# Patient Record
Sex: FEMALE | Race: WHITE | Employment: FULL TIME | URBAN - NONMETROPOLITAN AREA
[De-identification: names, ages, dates, MRNs, and addresses within clinical notes are randomized per-mention and may not be internally consistent; named-entity substitution may affect disease eponyms.]

---

## 2020-02-24 ENCOUNTER — PROCEDURE VISIT (OUTPATIENT)
Dept: OTOLARYNGOLOGY | Age: 55
End: 2020-02-24
Payer: COMMERCIAL

## 2020-02-24 PROCEDURE — 92553 AUDIOMETRY AIR & BONE: CPT | Performed by: AUDIOLOGIST

## 2020-02-24 PROCEDURE — 92567 TYMPANOMETRY: CPT | Performed by: AUDIOLOGIST

## 2020-03-10 ENCOUNTER — OFFICE VISIT (OUTPATIENT)
Dept: OTOLARYNGOLOGY | Age: 55
End: 2020-03-10
Payer: COMMERCIAL

## 2020-03-10 VITALS — WEIGHT: 293 LBS | HEIGHT: 71 IN | BODY MASS INDEX: 41.02 KG/M2

## 2020-03-10 PROBLEM — H65.492 CHRONIC MEE (MIDDLE EAR EFFUSION), LEFT: Status: ACTIVE | Noted: 2020-03-10

## 2020-03-10 PROCEDURE — 99242 OFF/OP CONSLTJ NEW/EST SF 20: CPT | Performed by: OTOLARYNGOLOGY

## 2020-03-10 RX ORDER — ARMODAFINIL 250 MG/1
TABLET ORAL
COMMUNITY
Start: 2019-12-31

## 2020-03-10 RX ORDER — HYDROCHLOROTHIAZIDE 12.5 MG/1
CAPSULE, GELATIN COATED ORAL
COMMUNITY
Start: 2020-02-15

## 2020-03-10 NOTE — PROGRESS NOTES
62 y.o.  female presents today with left ear fullness and hearing loss. Back in early January she awoke with fullness in her ear which has not resolved. She did not have any prior upper respiratory symptoms. She has not had trouble with her ears recently. She denies any ringing or associated dizziness. She has been using antibiotics and nasal steroids with no relief. Recently she was seen and evaluated by our audiologist and found to have a significant conductive hearing loss in the left ear with likely middle ear fluid. She is referred to ENT for further evaluation    History reviewed. No pertinent family history.   Social History     Socioeconomic History    Marital status: Unknown     Spouse name: None    Number of children: None    Years of education: None    Highest education level: None   Occupational History    None   Social Needs    Financial resource strain: None    Food insecurity     Worry: None     Inability: None    Transportation needs     Medical: None     Non-medical: None   Tobacco Use    Smoking status: Never Smoker    Smokeless tobacco: Never Used   Substance and Sexual Activity    Alcohol use: Not Currently    Drug use: Never    Sexual activity: None   Lifestyle    Physical activity     Days per week: None     Minutes per session: None    Stress: None   Relationships    Social connections     Talks on phone: None     Gets together: None     Attends Mandaeism service: None     Active member of club or organization: None     Attends meetings of clubs or organizations: None     Relationship status: None    Intimate partner violence     Fear of current or ex partner: None     Emotionally abused: None     Physically abused: None     Forced sexual activity: None   Other Topics Concern    None   Social History Narrative    None     Past Medical History:   Diagnosis Date    Conductive hearing loss of right ear with unrestricted hearing of left ear     Dysfunction of Eustachian tube, left      History reviewed. No pertinent surgical history. REVIEW OF SYSTEMS:  all other systems reviewed and are negative  General Health: no change in health status since last visit  Vestibular: denies related complaints  Ears: ear pain No Left recent infection No  Left recent drainage No  Left ear popping/ fullness Yes  Left constant  Hearing: hearing loss: Left suddenly  Tinnitus: No       Comments:     PHYSICAL EXAM:    Ht 5' 11\" (1.803 m)   Wt (!) 301 lb (136.5 kg)   BMI 41.98 kg/m²   Body mass index is 41.98 kg/m². General Appearance: well developed , well nourished and no distress  Head/ Face: normocephalic and atraumatic  Ears: Right Ear: External: external ears normal Otoscopy Ear Canal: canal clear Otoscopy TM: TM's mobile and tympanosclerosis: moderate Left Ear: External: external ears normal Otoscopy Ear Canal: canal clear Otoscopy TM: air/fluid level and tympanosclerosis: moderate  Hearing: Rinne A>B: Left, Rinne A<B: Left, [place] L, Air R>L and see audiogram  Oral:lips: normal Oropharynx:normal tongue: normal   Tonsils: s/p tonsillectomy  Neck: negative and supple  Thyroid: Slight fullness to palpation  Neuro: alert and oriented x3 and cranial nerves II- XII grossly intact  Psych/ Mood: cooperative and no depression, anxiety or agitation    Assessment & Plan:    Problem List Items Addressed This Visit        ENT Problems    Chronic SUMMER (middle ear effusion), left     Persistent left middle ear fluid by history. Likely present for at least 2 months. At this point unlikely to resolve spontaneously. Discussed treatment options including a round of steroids as opposed to myringotomy. Patient opted for the latter so we will set her up to return for myringotomy and likely tube placement            Other    Conductive hearing loss of right ear with unrestricted hearing of left ear     Measurable conductive loss in left ear. Tuning fork reverses in left ear.   Likely secondary to middle ear

## 2020-03-10 NOTE — ASSESSMENT & PLAN NOTE
Persistent left middle ear fluid by history. Likely present for at least 2 months. At this point unlikely to resolve spontaneously. Discussed treatment options including a round of steroids as opposed to myringotomy.   Patient opted for the latter so we will set her up to return for myringotomy and likely tube placement

## 2020-03-16 ENCOUNTER — PROCEDURE VISIT (OUTPATIENT)
Dept: OTOLARYNGOLOGY | Age: 55
End: 2020-03-16
Payer: COMMERCIAL

## 2020-03-16 VITALS
BODY MASS INDEX: 41.02 KG/M2 | WEIGHT: 293 LBS | DIASTOLIC BLOOD PRESSURE: 76 MMHG | HEIGHT: 71 IN | SYSTOLIC BLOOD PRESSURE: 124 MMHG

## 2020-03-16 PROCEDURE — 69433 CREATE EARDRUM OPENING: CPT | Performed by: OTOLARYNGOLOGY

## 2020-03-20 ENCOUNTER — TELEPHONE (OUTPATIENT)
Dept: OTOLARYNGOLOGY | Age: 55
End: 2020-03-20

## 2020-03-20 NOTE — TELEPHONE ENCOUNTER
[facility] was no answer so I left a VM for her to call back if she would like to sign up.

## 2020-03-30 ENCOUNTER — PROCEDURE VISIT (OUTPATIENT)
Dept: OTOLARYNGOLOGY | Age: 55
End: 2020-03-30
Payer: COMMERCIAL

## 2020-03-30 ENCOUNTER — OFFICE VISIT (OUTPATIENT)
Dept: OTOLARYNGOLOGY | Age: 55
End: 2020-03-30
Payer: COMMERCIAL

## 2020-03-30 VITALS
WEIGHT: 293 LBS | HEIGHT: 71 IN | TEMPERATURE: 98.2 F | SYSTOLIC BLOOD PRESSURE: 118 MMHG | BODY MASS INDEX: 41.02 KG/M2 | DIASTOLIC BLOOD PRESSURE: 78 MMHG

## 2020-03-30 PROBLEM — H65.492 CHRONIC MEE (MIDDLE EAR EFFUSION), LEFT: Status: RESOLVED | Noted: 2020-03-10 | Resolved: 2020-03-30

## 2020-03-30 PROBLEM — Z96.22 S/P MYRINGOTOMY WITH INSERTION OF TUBE: Status: ACTIVE | Noted: 2020-03-30

## 2020-03-30 PROCEDURE — 99212 OFFICE O/P EST SF 10 MIN: CPT | Performed by: OTOLARYNGOLOGY

## 2020-03-30 PROCEDURE — 92552 PURE TONE AUDIOMETRY AIR: CPT | Performed by: AUDIOLOGIST

## 2020-03-30 PROCEDURE — 92567 TYMPANOMETRY: CPT | Performed by: AUDIOLOGIST

## 2020-03-30 RX ORDER — CIPROFLOXACIN HYDROCHLORIDE 3.5 MG/ML
SOLUTION/ DROPS TOPICAL
Qty: 1 BOTTLE | Refills: 1 | Status: SHIPPED | OUTPATIENT
Start: 2020-03-30 | End: 2020-09-17

## 2020-03-30 NOTE — PROGRESS NOTES
62 y.o.  female presents today for follow-up. On March 16 she had a left ear tube placed here in the clinic. Since then she has observed a definite improvement in her hearing. She still occasionally has some sense of fullness in the left ear and feels there may be some discharge. History reviewed. No pertinent family history. Social History     Socioeconomic History    Marital status: Unknown     Spouse name: None    Number of children: None    Years of education: None    Highest education level: None   Occupational History    None   Social Needs    Financial resource strain: None    Food insecurity     Worry: None     Inability: None    Transportation needs     Medical: None     Non-medical: None   Tobacco Use    Smoking status: Never Smoker    Smokeless tobacco: Never Used   Substance and Sexual Activity    Alcohol use: Not Currently    Drug use: Never    Sexual activity: None   Lifestyle    Physical activity     Days per week: None     Minutes per session: None    Stress: None   Relationships    Social connections     Talks on phone: None     Gets together: None     Attends Episcopal service: None     Active member of club or organization: None     Attends meetings of clubs or organizations: None     Relationship status: None    Intimate partner violence     Fear of current or ex partner: None     Emotionally abused: None     Physically abused: None     Forced sexual activity: None   Other Topics Concern    None   Social History Narrative    None     Past Medical History:   Diagnosis Date    Conductive hearing loss of right ear with unrestricted hearing of left ear     Dysfunction of Eustachian tube, left      History reviewed. No pertinent surgical history.       REVIEW OF SYSTEMS:  all other systems reviewed and are negative  General Health: see HPI / problem list  Ears: ear popping/ fullness Yes  Left episodic  Hearing: improved: Yes and  Left       Comments:     PHYSICAL

## 2020-08-03 ENCOUNTER — TELEPHONE (OUTPATIENT)
Dept: OTOLARYNGOLOGY | Age: 55
End: 2020-08-03

## 2020-08-03 NOTE — TELEPHONE ENCOUNTER
Pt called states she was covid tested and was negative. Now she has clear drainage from nasal drainage side of ear with tube, her head hurts with vomiting ,aches and chills. Pt is still running a 102 fever since last week. Advised to go to PCP/urgent care office for eval. Pt v/u.

## 2020-08-18 ENCOUNTER — OFFICE VISIT (OUTPATIENT)
Dept: OTOLARYNGOLOGY | Age: 55
End: 2020-08-18
Payer: COMMERCIAL

## 2020-08-18 VITALS
SYSTOLIC BLOOD PRESSURE: 132 MMHG | HEIGHT: 71 IN | WEIGHT: 293 LBS | DIASTOLIC BLOOD PRESSURE: 88 MMHG | BODY MASS INDEX: 41.02 KG/M2

## 2020-08-18 PROBLEM — J31.0 CHRONIC RHINITIS: Status: ACTIVE | Noted: 2020-08-18

## 2020-08-18 PROCEDURE — 99213 OFFICE O/P EST LOW 20 MIN: CPT | Performed by: OTOLARYNGOLOGY

## 2020-08-18 RX ORDER — IPRATROPIUM BROMIDE 42 UG/1
2 SPRAY, METERED NASAL 3 TIMES DAILY
Qty: 1 BOTTLE | Refills: 1 | Status: SHIPPED | OUTPATIENT
Start: 2020-08-18 | End: 2020-09-17

## 2020-08-18 NOTE — PROGRESS NOTES
60 y.o.  female presents today with clear nasal drainage. In late July she began experiencing fever and upper respiratory symptoms of congestion and nasal drainage. She had a nasopharyngeal swab for COVID testing. It was after this that she began noticing the clear nasal drainage. She has since been treated with Augmentin and a few days of oral prednisone. Her fever has subsided but she continues to have clear nasal drainage especially when tilting forward. She has had sinus surgery in the past and was concerned this may have made her susceptible to the nasal drainage after the COVID testing. She was actually tested on 2 separate occasions both of which came back negative. She did feel that the prednisone helped slightly with the drainage but since going off the medication it has since recurred. She denies any aural fullness. History reviewed. No pertinent family history.   Social History     Socioeconomic History    Marital status: Unknown     Spouse name: None    Number of children: None    Years of education: None    Highest education level: None   Occupational History    None   Social Needs    Financial resource strain: None    Food insecurity     Worry: None     Inability: None    Transportation needs     Medical: None     Non-medical: None   Tobacco Use    Smoking status: Never Smoker    Smokeless tobacco: Never Used   Substance and Sexual Activity    Alcohol use: Not Currently    Drug use: Never    Sexual activity: None   Lifestyle    Physical activity     Days per week: None     Minutes per session: None    Stress: None   Relationships    Social connections     Talks on phone: None     Gets together: None     Attends Scientologist service: None     Active member of club or organization: None     Attends meetings of clubs or organizations: None     Relationship status: None    Intimate partner violence     Fear of current or ex partner: None     Emotionally abused: None     Physically abused: None     Forced sexual activity: None   Other Topics Concern    None   Social History Narrative    None     Past Medical History:   Diagnosis Date    Conductive hearing loss of right ear with unrestricted hearing of left ear     Dysfunction of Eustachian tube, left      History reviewed. No pertinent surgical history. REVIEW OF SYSTEMS:  all other systems reviewed and are negative  General Health: see HPI / problem list  Nose: post nasal drip: Yes and congestion: Yes and Essentially resolved  Throat: pain: No       Comments:     PHYSICAL EXAM:    /88   Ht 5' 11\" (1.803 m)   Wt 296 lb (134.3 kg)   BMI 41.28 kg/m²   Body mass index is 41.28 kg/m². General Appearance: well developed , well nourished and no distress  Head/ Face: normocephalic and atraumatic  Vocal Quality: good/ normal  Ears: Right Ear: External: external ears normal Otoscopy Ear Canal: canal clear Otoscopy TM: TM's normal Left Ear: External: external ears normal Otoscopy Ear Canal: extruded tube in canal Otoscopy TM: TM's dull  Hearing: Rinne A>B: Left and Rinne A=B: Left  Nose: mucosa congested and turbinates: hypertrophic  Psych/ Mood: cooperative and no depression, anxiety or agitation    Assessment & Plan:    Problem List Items Addressed This Visit        ENT Problems    Chronic rhinitis     History of prior sinus surgery. Currently not on any maintenance medications. Concerned that COVID testing in the face of her prior sinus surgery may l have caused to the clear nasal drainage. She feels that the drainage has a salty taste like tears. This would imply a possible CSF leak which I feel is unlikely as this whoever administered the test would have to be vastly inexperienced and I would expect her to be most uncomfortable had the swab entered the sinus cavities. She states the test when performed was not all that uncomfortable  This is something certainly that must be kept in mind.   Right now we will treat her with nasal irrigations (dexamethasone/clindamycin) and Atrovent. Will consider CTs testing if drainage persists               No orders of the defined types were placed in this encounter. Orders Placed This Encounter   Medications    ipratropium (ATROVENT) 0.06 % nasal spray     Si sprays by Each Nostril route 3 times daily     Dispense:  1 Bottle     Refill:  1             Please note that this chart was generated using dragon dictation software. Although every effort was made to ensure the accuracy of this automated transcription, some errors in transcription may have occurred.

## 2020-08-18 NOTE — ASSESSMENT & PLAN NOTE
History of prior sinus surgery. Currently not on any maintenance medications. Concerned that COVID testing in the face of her prior sinus surgery may l have caused to the clear nasal drainage. She feels that the drainage has a salty taste like tears. This would imply a possible CSF leak which I feel is unlikely as this whoever administered the test would have to be vastly inexperienced and I would expect her to be most uncomfortable had the swab entered the sinus cavities. She states the test when performed was not all that uncomfortable  This is something certainly that must be kept in mind. Right now we will treat her with nasal irrigations (dexamethasone/clindamycin) and Atrovent.   Will consider CTs testing if drainage persists

## 2020-09-01 ENCOUNTER — OFFICE VISIT (OUTPATIENT)
Dept: OTOLARYNGOLOGY | Age: 55
End: 2020-09-01
Payer: COMMERCIAL

## 2020-09-01 VITALS
DIASTOLIC BLOOD PRESSURE: 82 MMHG | SYSTOLIC BLOOD PRESSURE: 130 MMHG | HEIGHT: 71 IN | BODY MASS INDEX: 41.02 KG/M2 | WEIGHT: 293 LBS

## 2020-09-01 PROBLEM — G44.89 OTHER HEADACHE SYNDROME: Status: ACTIVE | Noted: 2020-09-01

## 2020-09-01 PROCEDURE — 99212 OFFICE O/P EST SF 10 MIN: CPT | Performed by: OTOLARYNGOLOGY

## 2020-09-01 NOTE — ASSESSMENT & PLAN NOTE
Using nasal irrigations and medication as directed. Patient states that drainage resolved late last week.   So as she is no longer concerned about the CSF leak she still has concerns about her headache symptoms

## 2020-09-01 NOTE — PROGRESS NOTES
60 y.o.  female presents today with a history of chronic nasal drainage. She was concerned about the possibility of a CSF leak possibly related to her traumatic COVID nasopharyngeal swab. She also remembers a blow to the head and not too long ago. She has been using the nasal irrigations as directed and late last week observed that the nasal drainage has ceased. She still however has headache symptoms and remains concerned that she may have a more serious problem and wishes to see a neurologist    History reviewed. No pertinent family history. Social History     Socioeconomic History    Marital status: Unknown     Spouse name: None    Number of children: None    Years of education: None    Highest education level: None   Occupational History    None   Social Needs    Financial resource strain: None    Food insecurity     Worry: None     Inability: None    Transportation needs     Medical: None     Non-medical: None   Tobacco Use    Smoking status: Never Smoker    Smokeless tobacco: Never Used   Substance and Sexual Activity    Alcohol use: Not Currently    Drug use: Never    Sexual activity: None   Lifestyle    Physical activity     Days per week: None     Minutes per session: None    Stress: None   Relationships    Social connections     Talks on phone: None     Gets together: None     Attends Buddhism service: None     Active member of club or organization: None     Attends meetings of clubs or organizations: None     Relationship status: None    Intimate partner violence     Fear of current or ex partner: None     Emotionally abused: None     Physically abused: None     Forced sexual activity: None   Other Topics Concern    None   Social History Narrative    None     Past Medical History:   Diagnosis Date    Conductive hearing loss of right ear with unrestricted hearing of left ear     Dysfunction of Eustachian tube, left      History reviewed.  No pertinent surgical history. REVIEW OF SYSTEMS:  all other systems reviewed and are negative  General Health: no change in health status since last visit  Neurologic: headache: Yes  Nose: runny nose: Resolved and post nasal drip: Resolved       Comments:     PHYSICAL EXAM:    /82   Ht 5' 11\" (1.803 m)   Wt 298 lb (135.2 kg)   BMI 41.56 kg/m²   Body mass index is 41.56 kg/m². General Appearance: well developed , well nourished and no distress  Head/ Face: normocephalic and atraumatic  Vocal Quality: good/ normal  Oral:lips: normal Oropharynx:normal tongue: normal   Neuro: alert and oriented x3 and cranial nerves II- XII grossly intact  Psych/ Mood: cooperative and no depression, anxiety or agitation    Assessment & Plan:    Problem List Items Addressed This Visit        ENT Problems    Chronic rhinitis     Using nasal irrigations and medication as directed. Patient states that drainage resolved late last week. So as she is no longer concerned about the CSF leak she still has concerns about her headache symptoms            Other    Other headache syndrome     Patient complaining of headache. Wishes to be referred to neurologist.  Will make appropriate referral         Relevant Don Honeycutt MD, Neurology, [place]          Orders Placed This Encounter   Procedures   Carmela White MD, Neurology, [place]     Referral Priority:   Routine     Referral Type:   Eval and Treat     Referral Reason:   Specialty Services Required     Referred to Provider:   Romaine Dietz MD     Requested Specialty:   Neurology     Number of Visits Requested:   1       No orders of the defined types were placed in this encounter. Please note that this chart was generated using dragon dictation software. Although every effort was made to ensure the accuracy of this automated transcription, some errors in transcription may have occurred.

## 2020-09-14 ENCOUNTER — OFFICE VISIT (OUTPATIENT)
Dept: OTOLARYNGOLOGY | Facility: CLINIC | Age: 55
End: 2020-09-14

## 2020-09-14 VITALS
HEART RATE: 72 BPM | TEMPERATURE: 96.2 F | WEIGHT: 293 LBS | DIASTOLIC BLOOD PRESSURE: 90 MMHG | SYSTOLIC BLOOD PRESSURE: 144 MMHG

## 2020-09-14 DIAGNOSIS — L68.0 HIRSUTISM: ICD-10-CM

## 2020-09-14 DIAGNOSIS — D48.9 NEOPLASM OF UNCERTAIN BEHAVIOR: Primary | ICD-10-CM

## 2020-09-14 PROCEDURE — 99203 OFFICE O/P NEW LOW 30 MIN: CPT | Performed by: NURSE PRACTITIONER

## 2020-09-14 PROCEDURE — 11104 PUNCH BX SKIN SINGLE LESION: CPT | Performed by: NURSE PRACTITIONER

## 2020-09-14 PROCEDURE — 88305 TISSUE EXAM BY PATHOLOGIST: CPT | Performed by: NURSE PRACTITIONER

## 2020-09-14 RX ORDER — HYDROCHLOROTHIAZIDE 12.5 MG/1
CAPSULE, GELATIN COATED ORAL
COMMUNITY

## 2020-09-14 RX ORDER — ARMODAFINIL 250 MG/1
TABLET ORAL
COMMUNITY

## 2020-09-14 RX ORDER — AMLODIPINE BESYLATE AND BENAZEPRIL HYDROCHLORIDE 10; 40 MG/1; MG/1
CAPSULE ORAL
COMMUNITY

## 2020-09-14 NOTE — PROGRESS NOTES
CC:   Chief Complaint   Patient presents with   • Skin Lesion     Area to right temple        HPI: Gabriela Varghese is a 55 y.o. female who presents for consultation regarding a skin lesion of the right temple.  The lesion has the following characteristics:    Location: Right temple  Quality: Raised  Severity: Mild  Duration: At least 20 years  Timing: Constant   Modifying Factors: None  Associated Signs & Symptoms: No enlargement, no change in color, no pain, no lymphadenopathy    Prior history of skin cancer: None.  She does report a history of removal of benign moles and skin tags in the past.    Dermatologist: Viji Saldivar MD    She also reports she is interested in possible laser hair removal to the chin    PFSH:  Past Medical History:   Diagnosis Date   • Hypertension    • Over weight    • Sleep disorder      Past Surgical History:   Procedure Laterality Date   • CHOLECYSTECTOMY     • GASTRIC BYPASS     • HYSTERECTOMY     • SINUS SURGERY       Family History   Problem Relation Age of Onset   • Heart failure Mother    • Diabetes Mother    • Hypertension Father    • Heart failure Father    • Diabetes Father    • Cancer Father    • Diabetes Maternal Grandmother    • Cancer Paternal Grandmother      Social History     Tobacco Use   • Smoking status: Never Smoker   • Smokeless tobacco: Never Used   Substance Use Topics   • Alcohol use: Never     Frequency: Never   • Drug use: Never     Allergies:  Codeine    Current Outpatient Medications:   •  amLODIPine-benazepril (LOTREL) 10-40 MG per capsule, Lotrel  1 CAPSULE, 10-40 MG, ONCE A DAY, 90 DAYS, Disp: , Rfl:   •  Armodafinil 250 MG tablet, armodafinil 250 mg tablet, Disp: , Rfl:   •  hydroCHLOROthiazide (MICROZIDE) 12.5 MG capsule, Take  by mouth., Disp: , Rfl:   •  Multiple Vitamin (MULTI-VITAMIN DAILY PO), Multi Vitamin, Disp: , Rfl:     ROS:  Review of Systems   Constitutional: Negative for chills and fever.   HENT: Negative for congestion, ear discharge, ear  pain, rhinorrhea and sore throat.    Respiratory: Negative for cough and shortness of breath.    Cardiovascular: Negative for chest pain.   Gastrointestinal: Negative for diarrhea, nausea and vomiting.       PE:  /90   Pulse 72   Temp 96.2 °F (35.7 °C) (Temporal)   Wt 135 kg (297 lb)   Physical Exam  Vitals signs and nursing note reviewed.   Constitutional:       General: She is not in acute distress.     Appearance: She is well-developed. She is not diaphoretic.   HENT:      Head: Normocephalic and atraumatic.        Right Ear: External ear normal.      Left Ear: External ear normal.      Nose: Nose normal.   Eyes:      General: No scleral icterus.        Right eye: No discharge.         Left eye: No discharge.      Conjunctiva/sclera: Conjunctivae normal.      Pupils: Pupils are equal, round, and reactive to light.   Neck:      Musculoskeletal: Normal range of motion and neck supple.      Thyroid: No thyromegaly.      Vascular: No JVD.      Trachea: No tracheal deviation.   Pulmonary:      Effort: Pulmonary effort is normal.      Breath sounds: No stridor.   Musculoskeletal: Normal range of motion.         General: No deformity.   Lymphadenopathy:      Cervical: No cervical adenopathy.   Skin:     General: Skin is warm and dry.      Coloration: Skin is not pale.      Findings: No erythema or rash.   Neurological:      Mental Status: She is alert and oriented to person, place, and time.      Cranial Nerves: No cranial nerve deficit.      Coordination: Coordination normal.   Psychiatric:         Speech: Speech normal.         Behavior: Behavior normal. Behavior is cooperative.         Thought Content: Thought content normal.         Judgment: Judgment normal.               Assessment:  1. Neoplasm of uncertain behavior    2. Hirsutism        Plan:    We have discussed the options of punch biopsy for definitive diagnosis versus excisional biopsy for complete removal.  The patient elects for punch biopsy  today.  Risks, benefits, alternatives, and complications were discussed.  Biopsy care instructions were given.  I will call with results and further treatment plan.    We will also provide her with a quote for laser hair removal of her chin.    Return in about 6 weeks (around 10/26/2020), or if symptoms worsen or fail to improve, for Recheck.      Kerri Higginbotham, NEIL   09/14/2020  15:47 CDT

## 2020-09-14 NOTE — PROGRESS NOTES
Procedure   Procedures    Preprocedure diagnosis: A changing lesion of the right temple    Post procedure diagnosis: Same    Procedure: Punch biopsy    Details:  Patient consent was obtained.  The skin was cleansed with alcohol.  The skin was infiltrated with 1 mL of 1% lidocaine with 1-100,000 epinephrine.  After approximately 10 minutes, a 4 millimeter punch biopsy was taken by placing circular motion on the biopsy tool, the skin was elevated and clipped with iris scissors.  The specimen was sent in formalin.  The skin was closed using a simple 5-0 fast absorbing gut suture.  Antibiotic ointment was placed over the biopsy site.  The patient tolerated the procedure with minimal discomfort.    Kerri Higginbotham, NEIL  09/14/20  17:49 CDT

## 2020-09-17 ENCOUNTER — OFFICE VISIT (OUTPATIENT)
Dept: NEUROLOGY | Age: 55
End: 2020-09-17
Payer: COMMERCIAL

## 2020-09-17 VITALS
BODY MASS INDEX: 41.02 KG/M2 | HEART RATE: 70 BPM | WEIGHT: 293 LBS | SYSTOLIC BLOOD PRESSURE: 142 MMHG | DIASTOLIC BLOOD PRESSURE: 88 MMHG | HEIGHT: 71 IN

## 2020-09-17 PROBLEM — J34.89 NASAL DRAINAGE: Status: ACTIVE | Noted: 2020-09-17

## 2020-09-17 PROBLEM — R51.9 NONINTRACTABLE HEADACHE: Status: ACTIVE | Noted: 2020-09-17

## 2020-09-17 LAB
LAB AP CASE REPORT: NORMAL
LAB AP CLINICAL INFORMATION: NORMAL
PATH REPORT.FINAL DX SPEC: NORMAL
PATH REPORT.GROSS SPEC: NORMAL

## 2020-09-17 PROCEDURE — 99204 OFFICE O/P NEW MOD 45 MIN: CPT | Performed by: PSYCHIATRY & NEUROLOGY

## 2020-09-17 NOTE — PROGRESS NOTES
Chief Complaint   Patient presents with    New Patient     Referred by Dr. Marilyn Blake for headaches        Mandi Mott is a 60y.o. year old female who is seen for evaluation of headache and left nasal draining. The patient indicates that in July 2020 she had a fever with vomiting and headache. The fever lasted about 3 weeks but she began to have severe nasal draining out of the left nostril. It was copious and would not stop. She would put a cotton in her nose but would be soaked very quickly. If she leaned forward it would be excessive and if she say back it would run down her throat. She had a severe frontal headache. There was no associated photophobia or phonophobia. There was no neck pain or change in vision. She indicated if she lay down and then got up her headache would be severe so she stopped trying to lay down. She was taking a lot of ibuprofen for her headache which was continuous. Over the last few weeks her headache is better. The nasal drainage has stopped. She was seen by ENT. The question of CSF leak was entertained. Active Ambulatory Problems     Diagnosis Date Noted    S/P myringotomy with insertion of tube 03/30/2020    Chronic rhinitis 08/18/2020    Other headache syndrome 09/01/2020    Nonintractable headache 09/17/2020    Nasal drainage 09/17/2020     Resolved Ambulatory Problems     Diagnosis Date Noted    Chronic SUMMER (middle ear effusion), left 03/10/2020    Conductive hearing loss of right ear with unrestricted hearing of left ear      Past Medical History:   Diagnosis Date    Dysfunction of Eustachian tube, left        History reviewed. No pertinent surgical history. History reviewed. No pertinent family history.     Allergies   Allergen Reactions    Codeine        Social History     Socioeconomic History    Marital status: Unknown     Spouse name: Not on file    Number of children: Not on file    Years of education: Not on file    Highest education level: Not on file Occupational History    Not on file   Social Needs    Financial resource strain: Not on file    Food insecurity     Worry: Not on file     Inability: Not on file    Transportation needs     Medical: Not on file     Non-medical: Not on file   Tobacco Use    Smoking status: Never Smoker    Smokeless tobacco: Never Used   Substance and Sexual Activity    Alcohol use: Not Currently    Drug use: Never    Sexual activity: Not on file   Lifestyle    Physical activity     Days per week: Not on file     Minutes per session: Not on file    Stress: Not on file   Relationships    Social connections     Talks on phone: Not on file     Gets together: Not on file     Attends [employer] service: Not on file     Active member of club or organization: Not on file     Attends meetings of clubs or organizations: Not on file     Relationship status: Not on file    Intimate partner violence     Fear of current or ex partner: Not on file     Emotionally abused: Not on file     Physically abused: Not on file     Forced sexual activity: Not on file   Other Topics Concern    Not on file   Social History Narrative    Not on file       Review of Systems     Constitutional - No fever or chills. No diaphoresis or significant fatigue. HENT -  No tinnitus or significant hearing loss. Eyes - no sudden vision change or eye pain  Respiratory - no significant shortness of breath or cough  Cardiovascular - no chest pain No palpitations or significant leg swelling  Gastrointestinal - no abdominal swelling or pain. Genitourinary - No difficulty urinating, dysuria  Musculoskeletal - no back pain or myalgia. Skin - no color change or rash  Neurologic - No seizures. No lateralizing weakness. Hematologic - no easy bruising or excessive bleeding. Psychiatric - no severe anxiety or nervousness. All other review of systems are negative.     Current Outpatient Medications   Medication Sig Dispense Refill    Cholecalciferol 50 MCG (2000 UT) CAPS daily      amLODIPine Besy-Benazepril HCl (LOTREL PO) Lotrel   1 CAPSULE, 10-40 MG, ONCE A DAY, 90 DAYS      hydrochlorothiazide (MICROZIDE) 12.5 MG capsule TK 1 C PO QAM FOR BLOOD PRESSURE      Armodafinil 250 MG TABS        No current facility-administered medications for this visit. BP (!) 142/88   Pulse 70   Ht 5' 11\" (1.803 m)   Wt 298 lb (135.2 kg)   BMI 41.56 kg/m²     Constitutional - well developed, well nourished. Eyes - conjunctiva normal.  Pupils not tested  Ear, nose, throat -hearing intact to finger rub No scars, masses, or lesions over external nose or ears, no atrophy of tongue  Neck-symmetric, no masses noted, no jugular vein distension  Respiration- chest wall appears symmetric, good expansion,   normal effort without use of accessory muscles  Musculoskeletal - no significant wasting of muscles noted, no bony deformities  Extremities-no clubbing, cyanosis or edema  Skin - warm, dry, and intact. No rash, erythema, or pallor.   Psychiatric - mood, affect, and behavior appear normal.      Neurological exam  Awake, alert, fluent oriented x 3 appropriate affect  Attention and concentration appear appropriate  Recent and remote memory appears unremarkable  Speech normal without dysarthria  No clear issues with language of fund of knowledge    Cranial Nerve Exam   CN II- Visual fields grossly unremarkable  CN III, IV,VI-EOMI, No nystagmus, conjugate eye movements, no ptosis  CN V-sensation intact to LT over face  CN VII-no facial assymetry  CN VIII-Hearing intact to finger rub  CN IX and X- Palate not tested  CN XI-not test shoulder shrug  CN XII-Tongue midline with no fasciculations or fibrillations    Motor Exam  V/V throughout upper and lower extremities bilaterally, no cogwheeling, normal tone    Sensory Exam  Sensation intact to light touch and temperature upper and lower extremities bilaterally    Reflexes   2+ biceps bilaterally  2+ brachioradialis  2+patella  2+ ankle jerks  No clonus ankles  No Medel's sign bilateral hands    Tremors- no tremors in hands or head noted    Gait  Normal base and speed  No ataxia    Coordination  Finger to nose-unremarkable    No results found for: [id number]  No results found for: WBC, HGB, HCT, MCV, PLT  No results found for: NA, K, CL, CO2, BUN, CREATININE, GLUCOSE, CALCIUM, PROT, LABALBU, BILITOT, ALKPHOS, AST, ALT, LABGLOM, GFRAA, AGRATIO, GLOB        Assessment    ICD-10-CM    1. Nonintractable headache, unspecified chronicity pattern, unspecified headache type  R51 MRI BRAIN W WO CONTRAST   2. Nasal drainage  J34.89 MRI BRAIN W WO CONTRAST       Her neurological examination today was unremarkable. Based upon her history and examination it is unclear what the etiology of her headache and severe nasal drainage was. Certainly CSF leak is in the differential. Clinically she is much improved and the nasal drainage has stopped. At this time she will be scheduled for an MRI of the brain. Low pressure headaches and have dural enhancement on MRI. She did not wish to initiate any medications for her headache. We did talk about rebound headache with her ibuprofen. The patient indicated understanding of the management plan. She is to follow up with me in one month and call with any issues. Plan    Orders Placed This Encounter   Procedures    MRI BRAIN W WO CONTRAST       No orders of the defined types were placed in this encounter. Return in about 4 weeks (around 10/15/2020). [employer] Dragon/transcription disclaimer:Significant part of this  encounter note is electronic transcription/translation of spoken language to printed text. The electronic translation of spoken language may be erroneous, or at times, nonsensical words or phrases may be inadvertently transcribed.  Although I have reviewed the note for such errors, some may still exist.

## 2020-09-18 ENCOUNTER — TELEPHONE (OUTPATIENT)
Dept: OTOLARYNGOLOGY | Facility: CLINIC | Age: 55
End: 2020-09-18

## 2020-09-18 NOTE — TELEPHONE ENCOUNTER
Spoke with patient regarding biopsy results.  Intradermal melanocytic nevus.  She reports this is healing well.  We will follow-up in approximately 6 months.

## 2020-09-29 ENCOUNTER — HOSPITAL ENCOUNTER (OUTPATIENT)
Dept: MRI IMAGING | Age: 55
Discharge: HOME | End: 2020-09-29
Payer: COMMERCIAL

## 2020-09-29 PROCEDURE — 70553 MRI BRAIN STEM W/O & W/DYE: CPT

## 2020-09-29 PROCEDURE — A9577 INJ MULTIHANCE: HCPCS | Performed by: PSYCHIATRY & NEUROLOGY

## 2020-09-29 PROCEDURE — 6360000004 HC RX CONTRAST MEDICATION: Performed by: PSYCHIATRY & NEUROLOGY

## 2020-09-29 RX ADMIN — GADOBENATE DIMEGLUMINE 20 ML: 529 INJECTION, SOLUTION INTRAVENOUS at 11:48

## 2020-10-01 ENCOUNTER — TELEPHONE (OUTPATIENT)
Dept: NEUROLOGY | Age: 55
End: 2020-10-01

## 2020-10-22 ENCOUNTER — OFFICE VISIT (OUTPATIENT)
Dept: NEUROLOGY | Age: 55
End: 2020-10-22
Payer: COMMERCIAL

## 2020-10-22 VITALS
HEART RATE: 83 BPM | DIASTOLIC BLOOD PRESSURE: 86 MMHG | SYSTOLIC BLOOD PRESSURE: 138 MMHG | HEIGHT: 71 IN | WEIGHT: 293 LBS | BODY MASS INDEX: 41.02 KG/M2

## 2020-10-22 PROCEDURE — 99214 OFFICE O/P EST MOD 30 MIN: CPT | Performed by: PSYCHIATRY & NEUROLOGY

## 2020-10-22 NOTE — PROGRESS NOTES
Chief Complaint   Patient presents with    Headache     1 month follow up, MRI results        Lulu Bhakta is a 60y.o. year old female who is seen for evaluation of headache and left nasal draining. The patient indicates that in July 2020 she had a fever with vomiting and headache. The fever lasted about 3 weeks but she began to have severe nasal draining out of the left nostril. It was copious and would not stop. She would put a cotton in her nose but would be soaked very quickly. If she leaned forward it would be excessive and if she say back it would run down her throat. She had a severe frontal headache. There was no associated photophobia or phonophobia. There was no neck pain or change in vision. She indicated if she lay down and then got up her headache would be severe so she stopped trying to lay down. She was taking a lot of ibuprofen for her headache which was continuous. Over the last few weeks her headache is better. The nasal drainage has stopped. She was seen by ENT. The question of CSF leak was entertained. Headache worse when gets up. in am.    Active Ambulatory Problems     Diagnosis Date Noted    S/P myringotomy with insertion of tube 03/30/2020    Chronic rhinitis 08/18/2020    Other headache syndrome 09/01/2020    Nonintractable headache 09/17/2020    Nasal drainage 09/17/2020     Resolved Ambulatory Problems     Diagnosis Date Noted    Chronic SUMMER (middle ear effusion), left 03/10/2020    Conductive hearing loss of right ear with unrestricted hearing of left ear      Past Medical History:   Diagnosis Date    Dysfunction of Eustachian tube, left        History reviewed. No pertinent surgical history. History reviewed. No pertinent family history.     Allergies   Allergen Reactions    Codeine        Social History     Socioeconomic History    Marital status:      Spouse name: Not on file    Number of children: Not on file    Years of education: Not on file    Highest education level: Not on file   Occupational History    Not on file   Social Needs    Financial resource strain: Not on file    Food insecurity     Worry: Not on file     Inability: Not on file    Transportation needs     Medical: Not on file     Non-medical: Not on file   Tobacco Use    Smoking status: Never Smoker    Smokeless tobacco: Never Used   Substance and Sexual Activity    Alcohol use: Not Currently    Drug use: Never    Sexual activity: Not on file   Lifestyle    Physical activity     Days per week: Not on file     Minutes per session: Not on file    Stress: Not on file   Relationships    Social connections     Talks on phone: Not on file     Gets together: Not on file     Attends [employer] service: Not on file     Active member of club or organization: Not on file     Attends meetings of clubs or organizations: Not on file     Relationship status: Not on file    Intimate partner violence     Fear of current or ex partner: Not on file     Emotionally abused: Not on file     Physically abused: Not on file     Forced sexual activity: Not on file   Other Topics Concern    Not on file   Social History Narrative    Not on file       Review of Systems     Constitutional - No fever or chills. No diaphoresis or significant fatigue. HENT -  No tinnitus or significant hearing loss. Eyes - no sudden vision change or eye pain  Respiratory - no significant shortness of breath or cough  Cardiovascular - no chest pain No palpitations or significant leg swelling  Gastrointestinal - no abdominal swelling or pain. Genitourinary - No difficulty urinating, dysuria  Musculoskeletal - no back pain or myalgia. Skin - no color change or rash  Neurologic - No seizures. No lateralizing weakness. Hematologic - no easy bruising or excessive bleeding. Psychiatric - no severe anxiety or nervousness. All other review of systems are negative.     Current Outpatient Medications   Medication Sig Dispense Refill  Cholecalciferol 50 MCG (2000 UT) CAPS daily      amLODIPine Besy-Benazepril HCl (LOTREL PO) Lotrel   1 CAPSULE, 10-40 MG, ONCE A DAY, 90 DAYS      hydrochlorothiazide (MICROZIDE) 12.5 MG capsule TK 1 C PO QAM FOR BLOOD PRESSURE      Armodafinil 250 MG TABS        No current facility-administered medications for this visit. /86   Pulse 83   Ht 5' 11\" (1.803 m)   Wt 298 lb (135.2 kg)   BMI 41.56 kg/m²     Constitutional - well developed, well nourished. Eyes - conjunctiva normal.  Ear, nose, throat - No scars, masses, or lesions over external nose or ears, no atrophy of tongue  Neck-symmetric, no masses noted, no jugular vein distension  Respiration- chest wall appears symmetric, good expansion,   normal effort without use of accessory muscles  Musculoskeletal - no significant wasting of muscles noted, no bony deformities  Extremities-no clubbing, cyanosis or edema  Skin - warm, dry, and intact. No rash, erythema, or pallor. Psychiatric - mood, affect, and behavior appear normal.      Neurological exam  Awake, alert, fluent oriented  appropriate affect  Attention and concentration appear appropriate  Recent and remote memory appears unremarkable  Speech normal without dysarthria  No clear issues with language of fund of knowledge    Cranial Nerve Exam     CN III, IV,VI-EOMI, No nystagmus, conjugate eye movements, no ptosis  CN VII-no facial assymetry        Motor Exam  antigravity throughout upper and lower extremities bilaterally    Tremors- no tremors in hands or head noted    Gait  Normal base and speed  No ataxia    No results found for: [id number]  No results found for: WBC, HGB, HCT, MCV, PLT  No results found for: NA, K, CL, CO2, BUN, CREATININE, GLUCOSE, CALCIUM, PROT, LABALBU, BILITOT, ALKPHOS, AST, ALT, LABGLOM, GFRAA, AGRATIO, GLOB    Impression    1. Negative MR imaging the brain. No acute intracranial process.     Signed by Dr rT Bhatti on 9/29/2020 11:57 AM Assessment    ICD-10-CM    1. Nonintractable headache, unspecified chronicity pattern, unspecified headache type  [facility]   2. Nasal drainage  J34.89 [facility]       Her neurological examination was unremarkable. Based upon her history and examination it is unclear what the etiology of her headache and severe nasal drainage was. Certainly CSF leak is in the differential. Clinically she is much improved and the nasal drainage now resumed. .Her MRI of the brain was normal with no dural enhancement which can be seen with low pressure headaches. She did not wish to initiate any medications for her headache. We did talk about rebound headache with her ibuprofen. She will be referred to our neurosurgeons for possible CSF leak. The patient indicated understanding of the management plan. She is to follow up with me in 2 monthand call with any issues. Plan    Orders Placed This Encounter   Procedures   [address] [facility], [place]       No orders of the defined types were placed in this encounter. Return in about 2 months (around 12/22/2020), or if symptoms worsen or fail to improve. EMR Dragon/transcription disclaimer:Significant part of this  encounter note is electronic transcription/translation of spoken language to printed text. The electronic translation of spoken language may be erroneous, or at times, nonsensical words or phrases may be inadvertently transcribed.  Although I have reviewed the note for such errors, some may still exist.

## 2020-10-23 ENCOUNTER — APPOINTMENT (OUTPATIENT)
Dept: GENERAL RADIOLOGY | Age: 55
End: 2020-10-23
Payer: COMMERCIAL

## 2020-10-23 ENCOUNTER — APPOINTMENT (OUTPATIENT)
Dept: MRI IMAGING | Age: 55
End: 2020-10-23
Payer: COMMERCIAL

## 2020-10-23 ENCOUNTER — HOSPITAL ENCOUNTER (EMERGENCY)
Age: 55
Discharge: HOME | End: 2020-10-23
Attending: EMERGENCY MEDICINE
Payer: COMMERCIAL

## 2020-10-23 ENCOUNTER — TELEPHONE (OUTPATIENT)
Dept: NEUROLOGY | Age: 55
End: 2020-10-23

## 2020-10-23 VITALS
SYSTOLIC BLOOD PRESSURE: 174 MMHG | DIASTOLIC BLOOD PRESSURE: 86 MMHG | TEMPERATURE: 99.1 F | WEIGHT: 293 LBS | BODY MASS INDEX: 41.56 KG/M2 | HEART RATE: 90 BPM | RESPIRATION RATE: 16 BRPM | OXYGEN SATURATION: 96 %

## 2020-10-23 LAB
ALBUMIN SERPL-MCNC: 4.5 G/DL (ref 3.5–5.2)
ALP BLD-CCNC: 101 U/L (ref 35–104)
ALT SERPL-CCNC: 27 U/L (ref 5–33)
ANION GAP SERPL CALCULATED.3IONS-SCNC: 12 MMOL/L (ref 7–19)
AST SERPL-CCNC: 26 U/L (ref 5–32)
BASOPHILS ABSOLUTE: 0.1 K/UL (ref 0–0.2)
BASOPHILS RELATIVE PERCENT: 0.3 % (ref 0–1)
BILIRUB SERPL-MCNC: 0.6 MG/DL (ref 0.2–1.2)
BILIRUBIN URINE: NEGATIVE
BLOOD, URINE: NEGATIVE
BUN BLDV-MCNC: 9 MG/DL (ref 6–20)
CALCIUM SERPL-MCNC: 9.4 MG/DL (ref 8.6–10)
CHLORIDE BLD-SCNC: 101 MMOL/L (ref 98–111)
CLARITY: CLEAR
CO2: 25 MMOL/L (ref 22–29)
COLOR: YELLOW
CREAT SERPL-MCNC: 0.5 MG/DL (ref 0.5–0.9)
EOSINOPHILS ABSOLUTE: 0.1 K/UL (ref 0–0.6)
EOSINOPHILS RELATIVE PERCENT: 0.5 % (ref 0–5)
GFR AFRICAN AMERICAN: >59
GFR NON-AFRICAN AMERICAN: >60
GLUCOSE BLD-MCNC: 111 MG/DL (ref 74–109)
GLUCOSE URINE: NEGATIVE MG/DL
HCT VFR BLD CALC: 39.4 % (ref 37–47)
HEMOGLOBIN: 13 G/DL (ref 12–16)
IMMATURE GRANULOCYTES #: 0.1 K/UL
KETONES, URINE: NEGATIVE MG/DL
LACTIC ACID: 1.6 MMOL/L (ref 0.5–1.9)
LEUKOCYTE ESTERASE, URINE: NEGATIVE
LYMPHOCYTES ABSOLUTE: 1.7 K/UL (ref 1.1–4.5)
LYMPHOCYTES RELATIVE PERCENT: 7.9 % (ref 20–40)
MCH RBC QN AUTO: 27.5 PG (ref 27–31)
MCHC RBC AUTO-ENTMCNC: 33 G/DL (ref 33–37)
MCV RBC AUTO: 83.5 FL (ref 81–99)
MONOCYTES ABSOLUTE: 1.1 K/UL (ref 0–0.9)
MONOCYTES RELATIVE PERCENT: 4.9 % (ref 0–10)
NEUTROPHILS ABSOLUTE: 18.4 K/UL (ref 1.5–7.5)
NEUTROPHILS RELATIVE PERCENT: 85.9 % (ref 50–65)
NITRITE, URINE: NEGATIVE
PDW BLD-RTO: 14.7 % (ref 11.5–14.5)
PH UA: 7.5 (ref 5–8)
PLATELET # BLD: 323 K/UL (ref 130–400)
PMV BLD AUTO: 9.4 FL (ref 9.4–12.3)
POTASSIUM REFLEX MAGNESIUM: 4.4 MMOL/L (ref 3.5–5)
PROTEIN UA: NEGATIVE MG/DL
RBC # BLD: 4.72 M/UL (ref 4.2–5.4)
SODIUM BLD-SCNC: 138 MMOL/L (ref 136–145)
SPECIFIC GRAVITY UA: 1.01 (ref 1–1.03)
TOTAL PROTEIN: 7.4 G/DL (ref 6.6–8.7)
UROBILINOGEN, URINE: 1 E.U./DL
WBC # BLD: 21.4 K/UL (ref 4.8–10.8)

## 2020-10-23 PROCEDURE — 2580000003 HC RX 258: Performed by: EMERGENCY MEDICINE

## 2020-10-23 PROCEDURE — 70553 MRI BRAIN STEM W/O & W/DYE: CPT

## 2020-10-23 PROCEDURE — 71045 X-RAY EXAM CHEST 1 VIEW: CPT

## 2020-10-23 PROCEDURE — 80053 COMPREHEN METABOLIC PANEL: CPT

## 2020-10-23 PROCEDURE — 87040 BLOOD CULTURE FOR BACTERIA: CPT

## 2020-10-23 PROCEDURE — 96365 THER/PROPH/DIAG IV INF INIT: CPT

## 2020-10-23 PROCEDURE — A9577 INJ MULTIHANCE: HCPCS | Performed by: EMERGENCY MEDICINE

## 2020-10-23 PROCEDURE — 36415 COLL VENOUS BLD VENIPUNCTURE: CPT

## 2020-10-23 PROCEDURE — 6360000002 HC RX W HCPCS: Performed by: EMERGENCY MEDICINE

## 2020-10-23 PROCEDURE — 86335 IMMUNFIX E-PHORSIS/URINE/CSF: CPT

## 2020-10-23 PROCEDURE — 96374 THER/PROPH/DIAG INJ IV PUSH: CPT

## 2020-10-23 PROCEDURE — 83605 ASSAY OF LACTIC ACID: CPT

## 2020-10-23 PROCEDURE — 99999 PR OFFICE/OUTPT VISIT,PROCEDURE ONLY: CPT | Performed by: EMERGENCY MEDICINE

## 2020-10-23 PROCEDURE — 6360000004 HC RX CONTRAST MEDICATION: Performed by: EMERGENCY MEDICINE

## 2020-10-23 PROCEDURE — 6370000000 HC RX 637 (ALT 250 FOR IP): Performed by: EMERGENCY MEDICINE

## 2020-10-23 PROCEDURE — 99284 EMERGENCY DEPT VISIT MOD MDM: CPT

## 2020-10-23 PROCEDURE — 81003 URINALYSIS AUTO W/O SCOPE: CPT

## 2020-10-23 PROCEDURE — 85025 COMPLETE CBC W/AUTO DIFF WBC: CPT

## 2020-10-23 RX ORDER — ACETAMINOPHEN 325 MG/1
650 TABLET ORAL ONCE
Status: COMPLETED | OUTPATIENT
Start: 2020-10-23 | End: 2020-10-23

## 2020-10-23 RX ORDER — AMOXICILLIN AND CLAVULANATE POTASSIUM 875; 125 MG/1; MG/1
1 TABLET, FILM COATED ORAL 2 TIMES DAILY
Qty: 20 TABLET | Refills: 0 | Status: SHIPPED | OUTPATIENT
Start: 2020-10-23 | End: 2020-11-02

## 2020-10-23 RX ADMIN — GADOBENATE DIMEGLUMINE 20 ML: 529 INJECTION, SOLUTION INTRAVENOUS at 14:05

## 2020-10-23 RX ADMIN — SODIUM CHLORIDE 1.5 G: 900 INJECTION INTRAVENOUS at 16:18

## 2020-10-23 RX ADMIN — ACETAMINOPHEN 650 MG: 325 TABLET ORAL at 15:41

## 2020-10-23 ASSESSMENT — PAIN SCALES - GENERAL
PAINLEVEL_OUTOF10: 7
PAINLEVEL_OUTOF10: 7

## 2020-10-23 ASSESSMENT — ENCOUNTER SYMPTOMS
SHORTNESS OF BREATH: 0
EYE PAIN: 0
EYE REDNESS: 0
VOMITING: 0
COUGH: 0
DIARRHEA: 0
ABDOMINAL PAIN: 0
VOICE CHANGE: 0
RHINORRHEA: 1

## 2020-10-23 ASSESSMENT — PAIN DESCRIPTION - LOCATION: LOCATION: HEAD

## 2020-10-23 ASSESSMENT — PAIN DESCRIPTION - DESCRIPTORS: DESCRIPTORS: ACHING

## 2020-10-23 NOTE — ED PROVIDER NOTES
[facility] EMERGENCY DEPT  EMERGENCY DEPARTMENT ENCOUNTER      Pt Name: Rene Choudhary  MRN: [id number]  [place] 1965  Date of evaluation: 10/23/2020  Provider: Sarina Freedman MD    CHIEF COMPLAINT     No chief complaint on file. HISTORY OF PRESENT ILLNESS   (Location/Symptom, Timing/Onset,Context/Setting, Quality, Duration, Modifying Factors, Severity)  Note limiting factors. Rene Choudhary is a 60 y.o. female who presents to the emergency department for evaluation of fevers that is associated with clear nasal drainage. Patient is specifically concerned for a possible CSF leak with potential subsequent infection. Patient had a Covid swab performed in August and states the clear nasal drainage started within a day after the swab was performed. Patient was evaluated by ENT who did not think much of her symptoms and nasal washes. The drainage improved for a while but then returned recently. Patient had a fever to nearly 101 last night. Patient continues to have headaches that are worse in the morning. Has seen neurology who was also concerned about potential CSF leak and put in referral to neurosurgery but she does not have that appointment scheduled yet. Patient came here for evaluation when she started having fevers. Denies any significant worsening of the headache recently. No neck stiffness, nausea, vomiting, vision changes. HPI    NursingNotes were reviewed. REVIEW OF SYSTEMS    (2-9 systems for level 4, 10 or more for level 5)     Review of Systems   Constitutional: Positive for fever. Negative for fatigue. HENT: Positive for rhinorrhea. Negative for congestion and voice change. Eyes: Negative for pain and redness. Respiratory: Negative for cough and shortness of breath. Cardiovascular: Negative for chest pain. Gastrointestinal: Negative for abdominal pain, diarrhea and vomiting. Endocrine: Negative. Genitourinary: Negative.     Musculoskeletal: Negative for arthralgias and gait problem. Skin: Negative for rash and wound. Neurological: Positive for headaches. Negative for weakness. Hematological: Negative. Psychiatric/Behavioral: Negative. All other systems reviewed and are negative. A complete review of systems was performed and is negative except as noted above in the HPI. PAST MEDICAL HISTORY     Past Medical History:   Diagnosis Date    Conductive hearing loss of right ear with unrestricted hearing of left ear     Dysfunction of Eustachian tube, left     Hypertension          SURGICAL HISTORY       Past Surgical History:   Procedure Laterality Date    BREAST SURGERY      reduction    CHOLECYSTECTOMY      GASTRIC BYPASS SURGERY      HYSTERECTOMY      LASIK      SINUS SURGERY      TYMPANOSTOMY TUBE PLACEMENT Left          CURRENT MEDICATIONS       Discharge Medication List as of 10/23/2020  4:20 PM      CONTINUE these medications which have NOT CHANGED    Details   Cholecalciferol 50 MCG (2000 UT) CAPS dailyHistorical Med      amLODIPine Besy-Benazepril HCl (LOTREL PO) Lotrel   1 CAPSULE, 10-40 MG, ONCE A DAY, 90 DAYSHistorical Med      hydrochlorothiazide (MICROZIDE) 12.5 MG capsule TK 1 C PO QAM FOR BLOOD PRESSUREHistorical Med      Armodafinil 250 MG TABS Historical Med             ALLERGIES     Codeine    FAMILY HISTORY     No family history on file.        SOCIAL HISTORY       Social History     Socioeconomic History    Marital status:      Spouse name: Not on file    Number of children: Not on file    Years of education: Not on file    Highest education level: Not on file   Occupational History    Not on file   Social Needs    Financial resource strain: Not on file    Food insecurity     Worry: Not on file     Inability: Not on file    Transportation needs     Medical: Not on file     Non-medical: Not on file   Tobacco Use    Smoking status: Never Smoker    Smokeless tobacco: Never Used   Substance and Sexual Activity    Alcohol use: Not Currently    Drug use: Never    Sexual activity: Not on file   Lifestyle    Physical activity     Days per week: Not on file     Minutes per session: Not on file    Stress: Not on file   Relationships    Social connections     Talks on phone: Not on file     Gets together: Not on file     Attends [employer] service: Not on file     Active member of club or organization: Not on file     Attends meetings of clubs or organizations: Not on file     Relationship status: Not on file    Intimate partner violence     Fear of current or ex partner: Not on file     Emotionally abused: Not on file     Physically abused: Not on file     Forced sexual activity: Not on file   Other Topics Concern    Not on file   Social History Narrative    Not on file       SCREENINGS    Amorita Coma Scale  Eye Opening: Spontaneous  Best Verbal Response: Oriented  Best Motor Response: Obeys commands  Amorita Coma Scale Score: 15        PHYSICAL EXAM    (up to 7 for level 4, 8 or more for level 5)     ED Triage Vitals   BP Temp Temp src Pulse Resp SpO2 Height Weight   10/23/20 1157 10/23/20 1153 -- 10/23/20 1153 10/23/20 1153 10/23/20 1153 -- 10/23/20 1153   (!) 174/86 99.1 °F (37.3 °C)  97 16 96 %  298 lb (135.2 kg)       Physical Exam  Vitals signs and nursing note reviewed. Constitutional:       General: She is not in acute distress. Appearance: She is well-developed. She is not toxic-appearing or diaphoretic. HENT:      Head: Normocephalic and atraumatic. Nose: Rhinorrhea present. Rhinorrhea is clear. Eyes:      General: No scleral icterus. Right eye: No discharge. Left eye: No discharge. Pupils: Pupils are equal, round, and reactive to light. Neck:      Musculoskeletal: Normal range of motion. Cardiovascular:      Rate and Rhythm: Normal rate and regular rhythm. Pulmonary:      Effort: Pulmonary effort is normal. No respiratory distress. Breath sounds: No stridor. Abdominal:      General: There is no distension. Musculoskeletal: Normal range of motion. General: No deformity. Skin:     General: Skin is warm and dry. Neurological:      Mental Status: She is alert and oriented to person, place, and time. GCS: GCS eye subscore is 4. GCS verbal subscore is 5. GCS motor subscore is 6. Cranial Nerves: No cranial nerve deficit. Sensory: Sensation is intact. Motor: Motor function is intact. No abnormal muscle tone. Comments: No meningismus   Psychiatric:         Behavior: Behavior normal.         Thought Content: Thought content normal.         Judgment: Judgment normal.         DIAGNOSTIC RESULTS     EKG: All EKG's are interpreted by the Emergency Department Physician who either signs or Co-signs this chart in the absence of a cardiologist.      RADIOLOGY:   Non-plain film images such as CT, Ultrasound and MRI are read by the radiologist. Rosa Showman images are visualized and preliminarily interpreted by the emergency physician with the below findings:      Interpretation per the Radiologist below, if available at the time of this note:    XR CHEST PORTABLE   Final Result   Right lower lung infiltrate. Old healed granulomas. Signed by Dr Rosamaria Bravo on 10/23/2020 3:35 PM      MRI BRAIN W WO CONTRAST   Final Result   1. No acute intracranial findings. 2. No MR findings to suggest intracranial hypotension.    Signed by Dr Mario Rivera on 10/23/2020 3:00 PM            ED BEDSIDE ULTRASOUND:   Performed by ED Physician - none    LABS:  Labs Reviewed   CBC WITH AUTO DIFFERENTIAL - Abnormal; Notable for the following components:       Result Value    WBC 21.4 (*)     RDW 14.7 (*)     Neutrophils % 85.9 (*)     Lymphocytes % 7.9 (*)     Neutrophils Absolute 18.4 (*)     Monocytes Absolute 1.10 (*)     All other components within normal limits   COMPREHENSIVE METABOLIC PANEL W/ REFLEX TO MG FOR LOW K - Abnormal; Notable for the following components:    Glucose 111 (*)     All other components within normal limits   CULTURE, BLOOD 1   CULTURE, BLOOD 2   URINE RT REFLEX TO CULTURE   LACTIC ACID, PLASMA   BETA 2 TRANSFERRIN       All other labs were within normal range or not returned as of this dictation. Medications   gadobenate dimeglumine (MULTIHANCE) injection 20 mL (20 mLs Intravenous Given 10/23/20 1405)   acetaminophen (TYLENOL) tablet 650 mg (650 mg Oral Given 10/23/20 1541)   ampicillin-sulbactam (UNASYN) 1.5 g IVPB minibag (0 g Intravenous Stopped 10/23/20 1738)       EMERGENCY DEPARTMENT COURSE and DIFFERENTIALDIAGNOSIS/MDM:   Vitals:    Vitals:    10/23/20 1153 10/23/20 1157 10/23/20 1351   BP:  (!) 174/86    Pulse: 97  90   Resp: 16     Temp: 99.1 °F (37.3 °C)     SpO2: 96%     Weight: 298 lb (135.2 kg)         MDM    ED Course as of Oct 23 2120   Fri Oct 23, 2020   1605 Patient with no meningeal signs on exam here. There is clear rhinorrhea of the left nare only. Patient's presentation is concerning to me for CSF leak given history of sinus surgery and onset after nasopharyngeal swab, which has been a well reported mechanism for CSF leak. White blood cell count elevated to 21,000 here. MRI with and without contrast performed which shows no signs of meningeal enhancement or signs of developing infection. Clear nasal drainage was obtained and sent to lab for a beta-2 transferrin which is a send out test and will not be available here. Discussed case with neurosurgery who agrees to see patient in clinic next week and is in agreement with antibiotics until then. Advised patient may require referral to a larger center for joint evaluation and/or intervention by neurosurgery and ENT but it is reasonable to try antibiotics and wait until the beta-2 transferrin results return.     [MONICA]      ED Course User Index  [MONICA] Kevin Paniagua MD     Evaluation and work-up here revealed no signs of emergent or life-threatening pathology that would necessitate admission for further work-up or management at this time. Patient is felt to be stable for discharge home with return precautions for worsening of the condition or development of new concerning symptoms. Patient was encouraged to follow-up with their primary care doctor in the appropriate timeframe. Necessary prescriptions and information have been provided for treatment at home. Patient voices understanding and agreement with the plan. CONSULTS:  None    PROCEDURES:  Unless otherwise notedbelow, none     Procedures      FINAL IMPRESSION     1. Acute nonintractable headache, unspecified headache type    2. Rhinorrhea    3.  CSF rhinorrhea          DISPOSITION/PLAN   DISPOSITION Decision To Discharge 10/23/2020 04:14:30 PM      PATIENT REFERRED TO:  [facility] EMERGENCY DEPT  [facility]  [phone]    If symptoms worsen    Anisa Meadows DO  [address] [place] [address]  [phone]    Schedule an appointment as soon as possible for a visit         DISCHARGE MEDICATIONS:  Discharge Medication List as of 10/23/2020  4:20 PM      START taking these medications    Details   amoxicillin-clavulanate (AUGMENTIN) 875-125 MG per tablet Take 1 tablet by mouth 2 times daily for 10 days, Disp-20 tablet,R-0Print                (Please note that portions of this note were completed with a voice recognition program.  Efforts were made to edit the dictations butoccasionally words are mis-transcribed.)    Sawyer Alvarez MD (electronically signed)  AttendingEmergency Physician          Sawyer Walsh MD  10/23/20 2630

## 2020-10-23 NOTE — TELEPHONE ENCOUNTER
Patient called and left a  stated that she had been scene yesterday in office with Dr Haylee Fried, [facility] a CSF leak  but patient stated that she wake up with fever , chills , and really bad headache. Her temp is 100.6. I advised her to come the ED to be evaluated. Also will inform Dr Haylee Fried.

## 2020-10-23 NOTE — ED NOTES
Patient returned from MRI at this time     Soledad May RN  10/23/20 [facility] Lisa Jacome

## 2020-10-23 NOTE — ED TRIAGE NOTES
Pt here with intermittent headache since the end of July pt also notes nasal drip of clear fluid that she states has glucose in it per her testing at home. Pt also has intermittent fever and chills.    covid tested x2 seen Dr Ludin Davenport and MRI done

## 2020-10-27 ENCOUNTER — TELEPHONE (OUTPATIENT)
Dept: NEUROSURGERY | Age: 55
End: 2020-10-27

## 2020-10-27 LAB — BETA-2 TRANSFERRIN: DETECTED

## 2020-10-28 LAB
BLOOD CULTURE, ROUTINE: NORMAL
CULTURE, BLOOD 2: NORMAL

## 2020-10-29 ENCOUNTER — OFFICE VISIT (OUTPATIENT)
Dept: NEUROSURGERY | Age: 55
End: 2020-10-29
Payer: COMMERCIAL

## 2020-10-29 VITALS
HEART RATE: 79 BPM | TEMPERATURE: 95.9 F | HEIGHT: 71 IN | SYSTOLIC BLOOD PRESSURE: 116 MMHG | OXYGEN SATURATION: 98 % | DIASTOLIC BLOOD PRESSURE: 75 MMHG | BODY MASS INDEX: 41.02 KG/M2 | WEIGHT: 293 LBS

## 2020-10-29 PROCEDURE — 99204 OFFICE O/P NEW MOD 45 MIN: CPT | Performed by: NEUROLOGICAL SURGERY

## 2020-10-29 ASSESSMENT — ENCOUNTER SYMPTOMS
RESPIRATORY NEGATIVE: 1
EYES NEGATIVE: 1
GASTROINTESTINAL NEGATIVE: 1
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
## 2020-11-04 ENCOUNTER — TELEPHONE (OUTPATIENT)
Dept: NEUROSURGERY | Age: 55
End: 2020-11-04

## 2020-11-04 NOTE — TELEPHONE ENCOUNTER
Patient called wanting to know the status of her appt with [address] [place] [address] Neurosurgery. I voiced to the patient that I would call them and see what was gong on and give her a call back once I received an update. [facility]surgery and spoke with Otilia Hall. She took the patient information over the phone and scheduled the patient for November 10, 2020 at 9:30 am with an arrival time of 9:15 am. Patient will be going to [facility] [address] [id number] and seeing Dr Nahum Gamez. Called patient back, provided her with the appt date and time, as well as the contact information and she voiced understanding to all.      Dr Nahum Gamez   [address]  [place] , [address] phone  [phone] fax

## 2020-11-09 NOTE — TELEPHONE ENCOUNTER
Patient called and voiced that she has been scheduled with the incorrect specialty provider; she voiced that Dr Akilah Dominguez is a spine surgeon not a brain surgeon and she will need to be referred to a brain surgeon. She provided me with the phone number to call in order to get started in the right direction. I voiced to the patient that I would call them and get her set up and then give her a call back with a date and time. Patient voiced that she is available anytime. [facility] Neurology at [phone]. Spoke with the  ( I did not catch her name) she voiced that I would need to send the referral by fax they do not accept referrals by phone. She voiced that I would need to send a demographic sheet, copy of insurance card, radiology reports, and clinic notes. She voiced that I would also need to carline the referral as urgent and the process will take up to 24 hours. She voiced that I can call and check on the referral at [phone]. Referral is to be faxed to [phone] with attention to Teodora Castellon. All records were faxed over today and patient has been notified. I also provided the patient with the [facility] Neurosurgery office number so the patient can call and follow up on the referral in case she does not hear back from their office within 24 hours. Patient voiced understanding to all.

## 2020-11-18 NOTE — TELEPHONE ENCOUNTER
Patient called again and left a  asking about her referral. I called and was able to speak to Colten. She looked up notes and was able to send a message. She did send this to the scheduling group as she doesn't know why patient has not been scheduled. I gave her my name and number and she will have them give me a call back.

## 2023-03-03 ENCOUNTER — HOSPITAL ENCOUNTER (OUTPATIENT)
Dept: MRI IMAGING | Age: 58
Discharge: HOME | End: 2023-03-03
Payer: COMMERCIAL

## 2023-03-03 DIAGNOSIS — G89.29 CHRONIC NONINTRACTABLE HEADACHE, UNSPECIFIED HEADACHE TYPE: ICD-10-CM

## 2023-03-03 DIAGNOSIS — G89.29 CHRONIC INTRACTABLE HEADACHE, UNSPECIFIED HEADACHE TYPE: ICD-10-CM

## 2023-03-03 DIAGNOSIS — R51.9 CHRONIC NONINTRACTABLE HEADACHE, UNSPECIFIED HEADACHE TYPE: ICD-10-CM

## 2023-03-03 DIAGNOSIS — R51.9 CHRONIC INTRACTABLE HEADACHE, UNSPECIFIED HEADACHE TYPE: ICD-10-CM

## 2023-03-03 PROCEDURE — A9577 INJ MULTIHANCE: HCPCS

## 2023-03-03 PROCEDURE — 70553 MRI BRAIN STEM W/O & W/DYE: CPT

## 2023-03-03 PROCEDURE — 70544 MR ANGIOGRAPHY HEAD W/O DYE: CPT

## 2023-03-03 PROCEDURE — 6360000004 HC RX CONTRAST MEDICATION

## 2023-03-03 RX ADMIN — GADOBENATE DIMEGLUMINE 20 ML: 529 INJECTION, SOLUTION INTRAVENOUS at 09:42
